# Patient Record
Sex: FEMALE | Race: OTHER | Employment: UNEMPLOYED | URBAN - METROPOLITAN AREA
[De-identification: names, ages, dates, MRNs, and addresses within clinical notes are randomized per-mention and may not be internally consistent; named-entity substitution may affect disease eponyms.]

---

## 2021-01-19 ENCOUNTER — HOSPITAL ENCOUNTER (OUTPATIENT)
Age: 21
Discharge: HOME | End: 2021-01-19

## 2021-01-19 ENCOUNTER — INITIAL PRENATAL (OUTPATIENT)
Dept: OBGYN | Age: 21
End: 2021-01-19

## 2021-01-19 VITALS
BODY MASS INDEX: 21.6 KG/M2 | SYSTOLIC BLOOD PRESSURE: 102 MMHG | HEIGHT: 60 IN | WEIGHT: 110 LBS | HEART RATE: 88 BPM | DIASTOLIC BLOOD PRESSURE: 66 MMHG

## 2021-01-19 DIAGNOSIS — Z34.81 PRENATAL CARE, SUBSEQUENT PREGNANCY, FIRST TRIMESTER: Primary | ICD-10-CM

## 2021-01-19 DIAGNOSIS — Z34.91 PRENATAL CARE IN FIRST TRIMESTER: ICD-10-CM

## 2021-01-19 DIAGNOSIS — Z34.81 PRENATAL CARE, SUBSEQUENT PREGNANCY, FIRST TRIMESTER: ICD-10-CM

## 2021-01-19 PROBLEM — Z78.9 LANGUAGE BARRIER: Status: ACTIVE | Noted: 2021-01-19

## 2021-01-19 LAB
ABO/RH: NORMAL
ANTIBODY SCREEN: NORMAL
BILIRUBIN URINE: NEGATIVE MG/DL
BLOOD, URINE: NEGATIVE
CLARITY: CLEAR
COLOR: YELLOW
GLUCOSE URINE: NEGATIVE MG/DL
KETONES, URINE: NEGATIVE MG/DL
LEUKOCYTE ESTERASE, URINE: NEGATIVE
NITRITE, URINE: NEGATIVE
PH UA: 5.5 (ref 5–8)
PROTEIN UA: ABNORMAL MG/DL
SPECIFIC GRAVITY UA: >=1.03 (ref 1–1.03)
UROBILINOGEN, URINE: 0.2 E.U./DL

## 2021-01-19 PROCEDURE — 86901 BLOOD TYPING SEROLOGIC RH(D): CPT

## 2021-01-19 PROCEDURE — 87340 HEPATITIS B SURFACE AG IA: CPT

## 2021-01-19 PROCEDURE — 86762 RUBELLA ANTIBODY: CPT

## 2021-01-19 PROCEDURE — 87390 HIV-1 AG IA: CPT

## 2021-01-19 PROCEDURE — 85025 COMPLETE CBC W/AUTO DIFF WBC: CPT

## 2021-01-19 PROCEDURE — 86702 HIV-2 ANTIBODY: CPT

## 2021-01-19 PROCEDURE — 86803 HEPATITIS C AB TEST: CPT

## 2021-01-19 PROCEDURE — 99204 OFFICE O/P NEW MOD 45 MIN: CPT | Performed by: OBSTETRICS & GYNECOLOGY

## 2021-01-19 PROCEDURE — 87086 URINE CULTURE/COLONY COUNT: CPT

## 2021-01-19 PROCEDURE — 81003 URINALYSIS AUTO W/O SCOPE: CPT

## 2021-01-19 PROCEDURE — 88175 CYTOPATH C/V AUTO FLUID REDO: CPT

## 2021-01-19 PROCEDURE — 36415 COLL VENOUS BLD VENIPUNCTURE: CPT

## 2021-01-19 PROCEDURE — 87491 CHLMYD TRACH DNA AMP PROBE: CPT

## 2021-01-19 PROCEDURE — 86701 HIV-1ANTIBODY: CPT

## 2021-01-19 PROCEDURE — 86780 TREPONEMA PALLIDUM: CPT

## 2021-01-19 PROCEDURE — 86850 RBC ANTIBODY SCREEN: CPT

## 2021-01-19 PROCEDURE — 87591 N.GONORRHOEAE DNA AMP PROB: CPT

## 2021-01-19 PROCEDURE — 86900 BLOOD TYPING SEROLOGIC ABO: CPT

## 2021-01-19 RX ORDER — VITAMIN A, VITAMIN C, VITAMIN D-3, VITAMIN E, VITAMIN B-1, VITAMIN B-2, NIACIN, VITAMIN B-6, CALCIUM, IRON, ZINC, COPPER 4000; 120; 400; 22; 1.84; 3; 20; 10; 1; 12; 200; 27; 25; 2 [IU]/1; MG/1; [IU]/1; MG/1; MG/1; MG/1; MG/1; MG/1; MG/1; UG/1; MG/1; MG/1; MG/1; MG/1
1 TABLET ORAL DAILY
Qty: 30 TABLET | Refills: 11 | Status: SHIPPED | OUTPATIENT
Start: 2021-01-19

## 2021-01-19 SDOH — HEALTH STABILITY: MENTAL HEALTH: HOW OFTEN DO YOU HAVE A DRINK CONTAINING ALCOHOL?: NEVER

## 2021-01-19 NOTE — PROGRESS NOTES
[facility] Obstetric Social History (Ukrainian)    Patient Name Haleigh Davies [facility]  8-6-21 Prenatal Care Began  1-19-21    Phone [phone] (home)  [facility]    Emergency Contact: Unique Rodrigues    Phone [phone]    Marital Status: Single    x             Living Situation:  Lives with  son and  brother    How many children do you have? 1    Name   Aleadurga Weldon Age  May   Name   Age    Name   Age    Name   Age    Name   Age      Attitude about pregnancy: happy     Preparation for Infant arrival:  will purchase     Any Domestic Abuse:  denies  Physical Abuse:  denies  Sexual Abuse: denies  Substance Abuse: denies  History of Depression: denies  Other Mental Health Issues:      Education:  High School  Occupation: unemployed     [facility]  referral [address] [address]   Medicaid  Child only   Support System:  , friends, mom    Father of Baby: [place]  Age:  25 Education:  11th   Occupation:  [employer] (works in [address] in warm months)  Emotional Support:   Financial Support:    Any other children? 0  Attitude toward Pregnancy?  surprised  Relationship with Father of Baby:   2 years    Patient concerns/plans for self and infant: none,stay home     Summary:  Pt scored a 0 on depression scale.   Psychological Screen no additional concerns     Referrals Offered: [facility] Referral, [address] List  Follow-up needed:      : DOMINIK QUINN  Date: 1/19/2021     Follow-up:

## 2021-01-19 NOTE — PROGRESS NOTES
Initial PNV. Nausea occasional vomiting, breast tenderness. Denies any pain or bleeding. No cats in home, denies any recent falls or travel outside of [place]. Discussed/declined MSAFP. Would accept a blood transfusion if every needed. Declines flu vaccine.

## 2021-01-20 LAB
HEPATITIS C ANTIBODY INTERPRETATION: NORMAL
HIV AG/AB: NORMAL
HIV ANTIGEN: NORMAL
HIV-1 ANTIBODY: NORMAL
HIV-2 AB: NORMAL
URINE CULTURE, ROUTINE: NORMAL

## 2021-01-21 LAB
BASOPHILS ABSOLUTE: 0 K/UL (ref 0–0.2)
BASOPHILS RELATIVE PERCENT: 0.3 %
EOSINOPHILS ABSOLUTE: 0.2 K/UL (ref 0–0.6)
EOSINOPHILS RELATIVE PERCENT: 2.1 %
HCT VFR BLD CALC: 37.6 % (ref 36–48)
HEMOGLOBIN: 12.9 G/DL (ref 12–16)
HEPATITIS B SURFACE ANTIGEN INTERPRETATION: NORMAL
LYMPHOCYTES ABSOLUTE: 1.6 K/UL (ref 1–5.1)
LYMPHOCYTES RELATIVE PERCENT: 22.6 %
MCH RBC QN AUTO: 29.3 PG (ref 26–34)
MCHC RBC AUTO-ENTMCNC: 34.3 G/DL (ref 31–36)
MCV RBC AUTO: 85.6 FL (ref 80–100)
MONOCYTES ABSOLUTE: 0.4 K/UL (ref 0–1.3)
MONOCYTES RELATIVE PERCENT: 6.2 %
NEUTROPHILS ABSOLUTE: 5 K/UL (ref 1.7–7.7)
NEUTROPHILS RELATIVE PERCENT: 68.8 %
PDW BLD-RTO: 13 % (ref 12.4–15.4)
PLATELET # BLD: 271 K/UL (ref 135–450)
PMV BLD AUTO: 8.7 FL (ref 5–10.5)
RBC # BLD: 4.4 M/UL (ref 4–5.2)
RUBELLA ANTIBODY IGG: 278.8 IU/ML
TOTAL SYPHILLIS IGG/IGM: NORMAL
WBC # BLD: 7.2 K/UL (ref 4–11)

## 2021-01-26 LAB
C. TRACHOMATIS DNA,THIN PREP: NEGATIVE
N. GONORRHOEAE DNA, THIN PREP: NEGATIVE

## 2021-01-29 ENCOUNTER — HOSPITAL ENCOUNTER (OUTPATIENT)
Dept: ULTRASOUND IMAGING | Age: 21
Discharge: HOME | End: 2021-01-29

## 2021-01-29 DIAGNOSIS — Z34.81 PRENATAL CARE, SUBSEQUENT PREGNANCY, FIRST TRIMESTER: ICD-10-CM

## 2021-01-29 PROCEDURE — 76801 OB US < 14 WKS SINGLE FETUS: CPT

## 2021-02-02 ENCOUNTER — TELEPHONE (OUTPATIENT)
Dept: OBGYN | Age: 21
End: 2021-02-02

## 2021-02-17 ENCOUNTER — ROUTINE PRENATAL (OUTPATIENT)
Dept: PERINATAL CARE | Age: 21
End: 2021-02-17

## 2021-02-17 VITALS
HEIGHT: 61 IN | DIASTOLIC BLOOD PRESSURE: 60 MMHG | WEIGHT: 111 LBS | SYSTOLIC BLOOD PRESSURE: 104 MMHG | BODY MASS INDEX: 20.96 KG/M2 | HEART RATE: 85 BPM

## 2021-02-17 DIAGNOSIS — Z78.9 LANGUAGE BARRIER: ICD-10-CM

## 2021-02-17 PROCEDURE — 76811 OB US DETAILED SNGL FETUS: CPT | Performed by: OBSTETRICS & GYNECOLOGY

## 2021-02-17 NOTE — PROGRESS NOTES
Initial MFM U/S with consult. Denies any vaginal bleeding, or abdominal/cramping pain. Still has periodic N&V. Next prenatal clinic appt. 2/18/21. Reports she has felt quickening last week.

## 2021-02-18 ENCOUNTER — ROUTINE PRENATAL (OUTPATIENT)
Dept: OBGYN | Age: 21
End: 2021-02-18

## 2021-02-18 VITALS
DIASTOLIC BLOOD PRESSURE: 56 MMHG | WEIGHT: 111.8 LBS | BODY MASS INDEX: 21.12 KG/M2 | HEART RATE: 74 BPM | SYSTOLIC BLOOD PRESSURE: 89 MMHG

## 2021-02-18 DIAGNOSIS — Z34.82 PRENATAL CARE, SUBSEQUENT PREGNANCY, SECOND TRIMESTER: Primary | ICD-10-CM

## 2021-02-18 LAB
BILIRUBIN URINE: NEGATIVE MG/DL
BLOOD, URINE: NEGATIVE
CLARITY: CLEAR
COLOR: YELLOW
GLUCOSE URINE: 100 MG/DL
KETONES, URINE: NEGATIVE MG/DL
LEUKOCYTE ESTERASE, URINE: ABNORMAL
NITRITE, URINE: NEGATIVE
PH UA: 6 (ref 5–8)
PROTEIN UA: NEGATIVE MG/DL
SPECIFIC GRAVITY UA: >=1.03 (ref 1–1.03)
UROBILINOGEN, URINE: 0.2 E.U./DL

## 2021-02-18 PROCEDURE — 81003 URINALYSIS AUTO W/O SCOPE: CPT

## 2021-02-18 PROCEDURE — 99213 OFFICE O/P EST LOW 20 MIN: CPT | Performed by: OBSTETRICS & GYNECOLOGY

## 2021-02-18 NOTE — PROGRESS NOTES
Chart reviewed. Dating by 6 w USD- states had level II USD yesterday for enlarged yolk sac and was WNL. Report not available today. Next USD for anatomy 20 weeks. Glucosuria noted (100 mg)- ate a sweet potato. No [address] DM and no GDM prior pregnancy.    Taking PNV

## 2021-02-19 LAB — URINE CULTURE, ROUTINE: NORMAL

## 2021-03-25 ENCOUNTER — HOSPITAL ENCOUNTER (OUTPATIENT)
Dept: ULTRASOUND IMAGING | Age: 21
Discharge: HOME | End: 2021-03-25

## 2021-03-25 ENCOUNTER — ROUTINE PRENATAL (OUTPATIENT)
Dept: OBGYN | Age: 21
End: 2021-03-25

## 2021-03-25 VITALS
DIASTOLIC BLOOD PRESSURE: 56 MMHG | HEART RATE: 80 BPM | SYSTOLIC BLOOD PRESSURE: 90 MMHG | WEIGHT: 114 LBS | BODY MASS INDEX: 21.54 KG/M2

## 2021-03-25 DIAGNOSIS — Z34.82 PRENATAL CARE, SUBSEQUENT PREGNANCY, SECOND TRIMESTER: ICD-10-CM

## 2021-03-25 DIAGNOSIS — Z34.82 PRENATAL CARE, SUBSEQUENT PREGNANCY, SECOND TRIMESTER: Primary | ICD-10-CM

## 2021-03-25 LAB
BILIRUBIN URINE: NEGATIVE MG/DL
BLOOD, URINE: NEGATIVE
CLARITY: CLEAR
COLOR: YELLOW
GLUCOSE URINE: NEGATIVE MG/DL
KETONES, URINE: NEGATIVE MG/DL
LEUKOCYTE ESTERASE, URINE: NEGATIVE
NITRITE, URINE: NEGATIVE
PH UA: 7 (ref 5–8)
PROTEIN UA: NEGATIVE MG/DL
SPECIFIC GRAVITY UA: 1.02 (ref 1–1.03)
UROBILINOGEN, URINE: 0.2 E.U./DL

## 2021-03-25 PROCEDURE — 99212 OFFICE O/P EST SF 10 MIN: CPT | Performed by: OBSTETRICS & GYNECOLOGY

## 2021-03-25 PROCEDURE — 76805 OB US >/= 14 WKS SNGL FETUS: CPT

## 2021-03-25 PROCEDURE — 81003 URINALYSIS AUTO W/O SCOPE: CPT

## 2021-05-06 ENCOUNTER — ROUTINE PRENATAL (OUTPATIENT)
Dept: OBGYN | Age: 21
End: 2021-05-06

## 2021-05-06 ENCOUNTER — HOSPITAL ENCOUNTER (OUTPATIENT)
Age: 21
Discharge: HOME | End: 2021-05-06

## 2021-05-06 VITALS
SYSTOLIC BLOOD PRESSURE: 94 MMHG | HEART RATE: 90 BPM | WEIGHT: 121 LBS | BODY MASS INDEX: 22.86 KG/M2 | DIASTOLIC BLOOD PRESSURE: 63 MMHG

## 2021-05-06 DIAGNOSIS — Z34.82 PRENATAL CARE, SUBSEQUENT PREGNANCY, SECOND TRIMESTER: ICD-10-CM

## 2021-05-06 DIAGNOSIS — Z34.82 PRENATAL CARE, SUBSEQUENT PREGNANCY, SECOND TRIMESTER: Primary | ICD-10-CM

## 2021-05-06 DIAGNOSIS — Z34.92 PRENATAL CARE IN SECOND TRIMESTER: ICD-10-CM

## 2021-05-06 LAB
BILIRUBIN URINE: NEGATIVE MG/DL
BLOOD, URINE: NEGATIVE
CLARITY: CLEAR
COLOR: YELLOW
GLUCOSE CHALLENGE: 116 MG/DL
GLUCOSE URINE: NEGATIVE MG/DL
HCT VFR BLD CALC: 31 % (ref 36–48)
HEMOGLOBIN: 10.9 G/DL (ref 12–16)
KETONES, URINE: NEGATIVE MG/DL
LEUKOCYTE ESTERASE, URINE: NEGATIVE
MCH RBC QN AUTO: 32.3 PG (ref 26–34)
MCHC RBC AUTO-ENTMCNC: 35.3 G/DL (ref 31–36)
MCV RBC AUTO: 91.6 FL (ref 80–100)
NITRITE, URINE: NEGATIVE
PDW BLD-RTO: 13.6 % (ref 12.4–15.4)
PH UA: 6 (ref 5–8)
PLATELET # BLD: 251 K/UL (ref 135–450)
PMV BLD AUTO: 8.6 FL (ref 5–10.5)
PROTEIN UA: 30 MG/DL
RBC # BLD: 3.38 M/UL (ref 4–5.2)
SPECIFIC GRAVITY UA: >=1.03 (ref 1–1.03)
UROBILINOGEN, URINE: 0.2 E.U./DL
WBC # BLD: 7.1 K/UL (ref 4–11)

## 2021-05-06 PROCEDURE — 81003 URINALYSIS AUTO W/O SCOPE: CPT

## 2021-05-06 PROCEDURE — 36415 COLL VENOUS BLD VENIPUNCTURE: CPT

## 2021-05-06 PROCEDURE — 99212 OFFICE O/P EST SF 10 MIN: CPT | Performed by: OBSTETRICS & GYNECOLOGY

## 2021-05-06 PROCEDURE — 82950 GLUCOSE TEST: CPT

## 2021-05-06 PROCEDURE — 85027 COMPLETE CBC AUTOMATED: CPT

## 2021-05-06 NOTE — PROGRESS NOTES
Doing well, reports good fetal, occasional/rare complaint of abdominal pain last time was last week. Denies any bleeding or leaking of fluid.  GCT today

## 2021-05-06 NOTE — PROGRESS NOTES
Social Service Note:   Patient completed depression scale. Patient scored a 0 and is not showing signs of depression. Patient didn't have any other questions or needs at this time.     Thanh SAEEDW, [place]

## 2021-06-10 ENCOUNTER — ROUTINE PRENATAL (OUTPATIENT)
Dept: OBGYN | Age: 21
End: 2021-06-10

## 2021-06-10 VITALS
DIASTOLIC BLOOD PRESSURE: 60 MMHG | BODY MASS INDEX: 24.19 KG/M2 | HEART RATE: 88 BPM | WEIGHT: 128 LBS | SYSTOLIC BLOOD PRESSURE: 94 MMHG

## 2021-06-10 DIAGNOSIS — Z34.93 PRENATAL CARE IN THIRD TRIMESTER: Primary | ICD-10-CM

## 2021-06-10 LAB
BILIRUBIN URINE: NEGATIVE MG/DL
BLOOD, URINE: NEGATIVE
CLARITY: CLEAR
COLOR: YELLOW
GLUCOSE URINE: NEGATIVE MG/DL
KETONES, URINE: NEGATIVE MG/DL
LEUKOCYTE ESTERASE, URINE: NEGATIVE
NITRITE, URINE: NEGATIVE
PH UA: 6 (ref 5–8)
PROTEIN UA: NEGATIVE MG/DL
SPECIFIC GRAVITY UA: >=1.03 (ref 1–1.03)
UROBILINOGEN, URINE: 0.2 E.U./DL

## 2021-06-10 PROCEDURE — 81003 URINALYSIS AUTO W/O SCOPE: CPT

## 2021-06-10 PROCEDURE — 99212 OFFICE O/P EST SF 10 MIN: CPT | Performed by: OBSTETRICS & GYNECOLOGY

## 2021-06-10 RX ORDER — FERROUS SULFATE 325(65) MG
325 TABLET ORAL DAILY
Qty: 30 TABLET | Refills: 3 | Status: ON HOLD | OUTPATIENT
Start: 2021-06-10 | End: 2021-08-16 | Stop reason: HOSPADM

## 2021-06-10 NOTE — PROGRESS NOTES
Pt with no c/o today; states baby is active. Patient denies vaginal bleeding and leaking of fluid/ctx. Iron prescription given and instructed with patient verbalizing understanding. Tdap vaccine information sheet given and explained; pt states understanding;wants to think about it.

## 2021-06-24 ENCOUNTER — ROUTINE PRENATAL (OUTPATIENT)
Dept: OBGYN | Age: 21
End: 2021-06-24

## 2021-06-24 VITALS
SYSTOLIC BLOOD PRESSURE: 103 MMHG | DIASTOLIC BLOOD PRESSURE: 65 MMHG | WEIGHT: 131 LBS | BODY MASS INDEX: 24.75 KG/M2 | HEART RATE: 86 BPM

## 2021-06-24 DIAGNOSIS — Z34.93 PRENATAL CARE IN THIRD TRIMESTER: Primary | ICD-10-CM

## 2021-06-24 LAB
BILIRUBIN URINE: NEGATIVE MG/DL
BLOOD, URINE: NEGATIVE
CLARITY: CLEAR
COLOR: YELLOW
GLUCOSE URINE: 100 MG/DL
KETONES, URINE: NEGATIVE MG/DL
LEUKOCYTE ESTERASE, URINE: NEGATIVE
NITRITE, URINE: NEGATIVE
PH UA: 7 (ref 5–8)
PROTEIN UA: ABNORMAL MG/DL
SPECIFIC GRAVITY UA: 1.02 (ref 1–1.03)
UROBILINOGEN, URINE: 1 E.U./DL

## 2021-06-24 PROCEDURE — 81003 URINALYSIS AUTO W/O SCOPE: CPT

## 2021-06-24 PROCEDURE — 99212 OFFICE O/P EST SF 10 MIN: CPT | Performed by: OBSTETRICS & GYNECOLOGY

## 2021-06-24 PROCEDURE — 90715 TDAP VACCINE 7 YRS/> IM: CPT | Performed by: OBSTETRICS & GYNECOLOGY

## 2021-06-24 PROCEDURE — 90471 IMMUNIZATION ADMIN: CPT | Performed by: OBSTETRICS & GYNECOLOGY

## 2021-06-24 PROCEDURE — 6360000002 HC RX W HCPCS: Performed by: OBSTETRICS & GYNECOLOGY

## 2021-06-24 RX ADMIN — TETANUS TOXOID, REDUCED DIPHTHERIA TOXOID AND ACELLULAR PERTUSSIS VACCINE, ADSORBED 0.5 ML: 5; 2.5; 8; 8; 2.5 SUSPENSION INTRAMUSCULAR at 10:35

## 2021-06-24 NOTE — PROGRESS NOTES
Routine visit, denies contractions bleeding or leaking of fluid. + fetal movement instruction given regarding daily kick counts , verbalized understanding.

## 2021-07-13 ENCOUNTER — ROUTINE PRENATAL (OUTPATIENT)
Dept: OBGYN | Age: 21
End: 2021-07-13

## 2021-07-13 VITALS
DIASTOLIC BLOOD PRESSURE: 62 MMHG | HEART RATE: 82 BPM | WEIGHT: 131 LBS | SYSTOLIC BLOOD PRESSURE: 94 MMHG | BODY MASS INDEX: 24.75 KG/M2

## 2021-07-13 DIAGNOSIS — Z34.93 PRENATAL CARE IN THIRD TRIMESTER: Primary | ICD-10-CM

## 2021-07-13 LAB
BILIRUBIN URINE: NEGATIVE MG/DL
BLOOD, URINE: NEGATIVE
CLARITY: CLEAR
COLOR: YELLOW
GLUCOSE URINE: NEGATIVE MG/DL
KETONES, URINE: NEGATIVE MG/DL
LEUKOCYTE ESTERASE, URINE: ABNORMAL
NITRITE, URINE: NEGATIVE
PH UA: 7.5 (ref 5–8)
PROTEIN UA: NEGATIVE MG/DL
SPECIFIC GRAVITY UA: 1.02 (ref 1–1.03)
UROBILINOGEN, URINE: 0.2 E.U./DL

## 2021-07-13 PROCEDURE — 81003 URINALYSIS AUTO W/O SCOPE: CPT

## 2021-07-13 PROCEDURE — 99212 OFFICE O/P EST SF 10 MIN: CPT | Performed by: OBSTETRICS & GYNECOLOGY

## 2021-07-13 NOTE — PROGRESS NOTES
Reports + fetal movement,reminded to do daily JORGE A, denies bleeding, leaking. C/O ctx off and on past 24 hours, called today to be seen before appt. On Thurs. C/O H/A and 1 large emesis this morning.

## 2021-07-20 ENCOUNTER — ROUTINE PRENATAL (OUTPATIENT)
Dept: OBGYN | Age: 21
End: 2021-07-20

## 2021-07-20 VITALS
SYSTOLIC BLOOD PRESSURE: 106 MMHG | WEIGHT: 137 LBS | HEART RATE: 89 BPM | DIASTOLIC BLOOD PRESSURE: 70 MMHG | BODY MASS INDEX: 25.89 KG/M2

## 2021-07-20 DIAGNOSIS — Z34.83 NORMAL PREGNANCY IN MULTIGRAVIDA IN THIRD TRIMESTER: Primary | ICD-10-CM

## 2021-07-20 LAB
BILIRUBIN URINE: NEGATIVE MG/DL
BLOOD, URINE: NEGATIVE
CLARITY: CLEAR
COLOR: YELLOW
GLUCOSE URINE: NEGATIVE MG/DL
KETONES, URINE: NEGATIVE MG/DL
LEUKOCYTE ESTERASE, URINE: NEGATIVE
NITRITE, URINE: NEGATIVE
PH UA: 7 (ref 5–8)
PROTEIN UA: NEGATIVE MG/DL
SPECIFIC GRAVITY UA: 1.01 (ref 1–1.03)
UROBILINOGEN, URINE: 0.2 E.U./DL

## 2021-07-20 PROCEDURE — 81003 URINALYSIS AUTO W/O SCOPE: CPT

## 2021-07-20 PROCEDURE — 99212 OFFICE O/P EST SF 10 MIN: CPT | Performed by: OBSTETRICS & GYNECOLOGY

## 2021-07-20 NOTE — PROGRESS NOTES
Reports + fetal movement, meeting JORGE A,denies bleeding, leaking of fluid. Reports irreg ctx. Labor precautions reviewed.

## 2021-07-27 ENCOUNTER — ROUTINE PRENATAL (OUTPATIENT)
Dept: OBGYN | Age: 21
End: 2021-07-27

## 2021-07-27 ENCOUNTER — HOSPITAL ENCOUNTER (OUTPATIENT)
Age: 21
Discharge: HOME | End: 2021-07-27

## 2021-07-27 VITALS
SYSTOLIC BLOOD PRESSURE: 108 MMHG | WEIGHT: 138 LBS | DIASTOLIC BLOOD PRESSURE: 72 MMHG | BODY MASS INDEX: 26.07 KG/M2 | HEART RATE: 100 BPM

## 2021-07-27 DIAGNOSIS — Z34.83 PRENATAL CARE, SUBSEQUENT PREGNANCY, THIRD TRIMESTER: Primary | ICD-10-CM

## 2021-07-27 LAB
BILIRUBIN URINE: NEGATIVE MG/DL
BLOOD, URINE: NEGATIVE
CLARITY: CLEAR
COLOR: YELLOW
GLUCOSE URINE: NEGATIVE MG/DL
HCT VFR BLD CALC: 36.7 % (ref 36–48)
HEMOGLOBIN: 12.9 G/DL (ref 12–16)
KETONES, URINE: NEGATIVE MG/DL
LEUKOCYTE ESTERASE, URINE: NEGATIVE
MCH RBC QN AUTO: 31.6 PG (ref 26–34)
MCHC RBC AUTO-ENTMCNC: 35.1 G/DL (ref 31–36)
MCV RBC AUTO: 90 FL (ref 80–100)
NITRITE, URINE: NEGATIVE
PDW BLD-RTO: 13.9 % (ref 12.4–15.4)
PH UA: 6 (ref 5–8)
PLATELET # BLD: 244 K/UL (ref 135–450)
PMV BLD AUTO: 8.9 FL (ref 5–10.5)
PROTEIN UA: NEGATIVE MG/DL
RBC # BLD: 4.07 M/UL (ref 4–5.2)
SPECIFIC GRAVITY UA: 1.02 (ref 1–1.03)
UROBILINOGEN, URINE: 0.2 E.U./DL
WBC # BLD: 7.7 K/UL (ref 4–11)

## 2021-07-27 PROCEDURE — 87081 CULTURE SCREEN ONLY: CPT

## 2021-07-27 PROCEDURE — 36415 COLL VENOUS BLD VENIPUNCTURE: CPT

## 2021-07-27 PROCEDURE — 85027 COMPLETE CBC AUTOMATED: CPT

## 2021-07-27 PROCEDURE — 99212 OFFICE O/P EST SF 10 MIN: CPT | Performed by: OBSTETRICS & GYNECOLOGY

## 2021-07-27 PROCEDURE — 81003 URINALYSIS AUTO W/O SCOPE: CPT

## 2021-07-27 NOTE — PROGRESS NOTES
Report + fetal movement, denies bleeding, leaking of fluid or pain. Reports irregular ctx. Meeting daily [address]

## 2021-07-30 LAB — GROUP B STREP CULTURE: NORMAL

## 2021-08-02 ENCOUNTER — HOSPITAL ENCOUNTER (OUTPATIENT)
Age: 21
Discharge: HOME | End: 2021-08-02
Attending: OBSTETRICS & GYNECOLOGY | Admitting: OBSTETRICS & GYNECOLOGY
Payer: MEDICAID

## 2021-08-02 VITALS
WEIGHT: 135 LBS | HEART RATE: 92 BPM | TEMPERATURE: 98.8 F | RESPIRATION RATE: 18 BRPM | BODY MASS INDEX: 26.5 KG/M2 | DIASTOLIC BLOOD PRESSURE: 63 MMHG | HEIGHT: 60 IN | SYSTOLIC BLOOD PRESSURE: 95 MMHG

## 2021-08-02 PROBLEM — Z34.90 TERM PREGNANCY: Status: ACTIVE | Noted: 2021-08-02

## 2021-08-02 PROCEDURE — 99211 OFF/OP EST MAY X REQ PHY/QHP: CPT

## 2021-08-02 PROCEDURE — 59025 FETAL NON-STRESS TEST: CPT

## 2021-08-02 NOTE — FLOWSHEET NOTE
Dr Hollis Wheatley at bedside, orders received to discharge patient to home and follow up as scheduled.   Pain is likely sciatica

## 2021-08-02 NOTE — FLOWSHEET NOTE
Discharge instructions reviewed with patient and friend. Both verbalize understanding and have no further complaints or concerns at this time.  Patient is ambulatory off unit

## 2021-08-03 ENCOUNTER — ROUTINE PRENATAL (OUTPATIENT)
Dept: OBGYN | Age: 21
End: 2021-08-03
Payer: MEDICAID

## 2021-08-03 VITALS
WEIGHT: 140 LBS | SYSTOLIC BLOOD PRESSURE: 101 MMHG | HEART RATE: 96 BPM | DIASTOLIC BLOOD PRESSURE: 70 MMHG | BODY MASS INDEX: 27.34 KG/M2

## 2021-08-03 DIAGNOSIS — Z34.83 ENCOUNTER FOR SUPERVISION OF OTHER NORMAL PREGNANCY IN THIRD TRIMESTER: Primary | ICD-10-CM

## 2021-08-03 LAB
BILIRUBIN URINE: NEGATIVE MG/DL
BLOOD, URINE: NEGATIVE
CLARITY: CLEAR
COLOR: YELLOW
GLUCOSE URINE: NEGATIVE MG/DL
KETONES, URINE: ABNORMAL MG/DL
LEUKOCYTE ESTERASE, URINE: NEGATIVE
NITRITE, URINE: NEGATIVE
PH UA: 6.5 (ref 5–8)
PROTEIN UA: NEGATIVE MG/DL
SPECIFIC GRAVITY UA: 1.02 (ref 1–1.03)
UROBILINOGEN, URINE: 1 E.U./DL

## 2021-08-03 PROCEDURE — 99212 OFFICE O/P EST SF 10 MIN: CPT | Performed by: OBSTETRICS & GYNECOLOGY

## 2021-08-03 PROCEDURE — 81003 URINALYSIS AUTO W/O SCOPE: CPT

## 2021-08-03 NOTE — H&P
Department of Obstetrics and Gynecology  Labor and Delivery Triage Note        CHIEF COMPLAINT:  Right hip pain    HISTORY OF PRESENT ILLNESS:      The patient is a 19 y.o. female 37w6d. OB History        2    Para   1    Term   1            AB        Living   1       SAB        TAB        Ectopic        Molar        Multiple        Live Births   1              Patient presents with a chief complaint as above. Her pain is located at suprapubic region and is described as aching. The pain is intermittent and started several weeks ago    Estimated Due Date:  Estimated Date of Delivery: 21    PRENATAL CARE:    Complicated by: none    REVIEW OF SYSTEMS:    CONSTITUTIONAL:  negative  EYES:  negative  HEENT:  negative  RESPIRATORY:  negative  CARDIOVASCULAR:  negative  GASTROINTESTINAL:  negative  GENITOURINARY:  negative  INTEGUMENT/BREAST:  negative  MUSCULOSKELETAL:  negative  BEHAVIOR/PSYCH:  negative    PHYSICAL EXAM:    Vitals:    21 1924   BP: 95/63   Pulse: 92   Resp: 18   Temp: 98.8 °F (37.1 °C)   Weight: 135 lb (61.2 kg)   Height: 5' (1.524 m)       Physical Examination: General appearance - alert, well appearing, and in no distress  Chest - clear to auscultation, no wheezes, rales or rhonchi, symmetric air entry  Heart - normal rate, regular rhythm, normal S1, S2, no murmurs, rubs, clicks or gallops  Abdomen - soft, nontender, nondistended, no masses or organomegaly                         Contraction frequency:  0 minutes    Membranes:  Intact    Lab Results   Component Value Date    WBC 7.7 2021    HGB 12.9 2021    HCT 36.7 2021     2021       ASSESSMENT AND PLAN:  Discharge Dx: The patient is a 19 y.o. female 37w6d.     OB History        2    Para   1    Term   1            AB        Living   1       SAB        TAB        Ectopic        Molar        Multiple        Live Births   1            Right hip pain- most likely muskulo skeletal- local heat is recommended.     Disposition:  Patient discharged home and instructed on symptoms of labor, rupture of membranes, vaginal bleeding, fetal movement and fever  Medications:    Kari Duff   Home Medication Instructions AUU:[id number]    Printed on:08/03/21 5304   Medication Information                      ferrous sulfate (ANG-ALEXANDREA) 325 (65 Fe) MG tablet  Take 1 tablet by mouth daily             Prenatal Vit-Fe Fumarate-FA (PRENATAL VITAMIN PLUS LOW IRON) 27-1 MG TABS  Take 1 tablet by mouth daily                F/U Instructions: as needed

## 2021-08-03 NOTE — PROGRESS NOTES
Routine visit, complains in increase of contractions and strength denies bleeding does have mucous discharge. + fetal movement meeting daily kick counts.

## 2021-08-05 ENCOUNTER — HOSPITAL ENCOUNTER (OUTPATIENT)
Age: 21
Discharge: HOME | End: 2021-08-05
Attending: OBSTETRICS & GYNECOLOGY | Admitting: OBSTETRICS & GYNECOLOGY
Payer: MEDICAID

## 2021-08-05 VITALS — TEMPERATURE: 97.7 F | RESPIRATION RATE: 17 BRPM

## 2021-08-05 LAB
BILIRUBIN URINE: NEGATIVE
BLOOD, URINE: NEGATIVE
CLARITY: CLEAR
COLOR: YELLOW
GLUCOSE URINE: NEGATIVE MG/DL
KETONES, URINE: NEGATIVE MG/DL
LEUKOCYTE ESTERASE, URINE: NEGATIVE
MICROSCOPIC EXAMINATION: NORMAL
NITRITE, URINE: NEGATIVE
PH UA: 6.5 (ref 5–8)
PROTEIN UA: NEGATIVE MG/DL
SPECIFIC GRAVITY UA: 1.02 (ref 1–1.03)
URINE TYPE: NORMAL
UROBILINOGEN, URINE: 1 E.U./DL

## 2021-08-05 PROCEDURE — 81003 URINALYSIS AUTO W/O SCOPE: CPT

## 2021-08-05 PROCEDURE — 59025 FETAL NON-STRESS TEST: CPT

## 2021-08-05 PROCEDURE — 99211 OFF/OP EST MAY X REQ PHY/QHP: CPT

## 2021-08-05 NOTE — FLOWSHEET NOTE
Discharge instructions discussed with patient. Labor precautions given and verbal understanding was recd. Patient discharged home, stable , and undelivered.

## 2021-08-05 NOTE — FLOWSHEET NOTE
Spoke with Dr. Cristela Valdovinos, update on patient status and SVE, no change. POc to discharge home.

## 2021-08-05 NOTE — FLOWSHEET NOTE
Patient arrive to triage with c/o contractions. Placed on monitor, history complete via video translation. POC discussed and will monitor for labor. Verbal understanding recd.

## 2021-08-05 NOTE — PROCEDURES
FETAL SURVEILLANCE TESTING SUMMARY    INDICATIONS:  rule out uterine contractions    OBJECTIVE RESULTS:  Fetal heart variability: moderate  Fetal Heart Rate accelerations: yes  Baseline FHR: 135 per minute  Fetal Non-stress Test: reactive    Fetal surveillance: reassuring    Occasional contractions, no cervical change between exams

## 2021-08-10 ENCOUNTER — ROUTINE PRENATAL (OUTPATIENT)
Dept: OBGYN | Age: 21
End: 2021-08-10
Payer: MEDICAID

## 2021-08-10 VITALS
HEART RATE: 72 BPM | SYSTOLIC BLOOD PRESSURE: 112 MMHG | DIASTOLIC BLOOD PRESSURE: 77 MMHG | WEIGHT: 140 LBS | BODY MASS INDEX: 27.34 KG/M2

## 2021-08-10 DIAGNOSIS — Z34.93 PRENATAL CARE IN THIRD TRIMESTER: Primary | ICD-10-CM

## 2021-08-10 LAB
BILIRUBIN URINE: NEGATIVE MG/DL
BLOOD, URINE: NEGATIVE
CLARITY: CLEAR
COLOR: YELLOW
GLUCOSE URINE: NEGATIVE MG/DL
KETONES, URINE: NEGATIVE MG/DL
LEUKOCYTE ESTERASE, URINE: ABNORMAL
NITRITE, URINE: NEGATIVE
PH UA: 7.5 (ref 5–8)
PROTEIN UA: NEGATIVE MG/DL
SPECIFIC GRAVITY UA: 1.01 (ref 1–1.03)
UROBILINOGEN, URINE: 0.2 E.U./DL

## 2021-08-10 PROCEDURE — 81003 URINALYSIS AUTO W/O SCOPE: CPT

## 2021-08-10 PROCEDURE — 99213 OFFICE O/P EST LOW 20 MIN: CPT | Performed by: OBSTETRICS & GYNECOLOGY

## 2021-08-10 NOTE — PROGRESS NOTES
Pt with no c/o today; states baby is active and meeting daily kick counts. Patient states no vaginal bleeding and occasional leaking of white mucous vaginal fluid/ occasional ctx.

## 2021-08-15 ENCOUNTER — HOSPITAL ENCOUNTER (OUTPATIENT)
Age: 21
Discharge: HOME | DRG: 560 | End: 2021-08-15
Attending: OBSTETRICS & GYNECOLOGY | Admitting: OBSTETRICS & GYNECOLOGY
Payer: MEDICAID

## 2021-08-15 VITALS
RESPIRATION RATE: 18 BRPM | SYSTOLIC BLOOD PRESSURE: 120 MMHG | TEMPERATURE: 98.1 F | DIASTOLIC BLOOD PRESSURE: 74 MMHG | HEART RATE: 72 BPM

## 2021-08-15 PROCEDURE — 99211 OFF/OP EST MAY X REQ PHY/QHP: CPT

## 2021-08-15 NOTE — FLOWSHEET NOTE
Dr. Manuel  notified of patient arrival. Dr. Manuel  made aware of cervical exam and desire to not be induced. Per MD have patient walk then recheck cervix. Patient agreeable with plan of care.

## 2021-08-16 ENCOUNTER — ANESTHESIA EVENT (OUTPATIENT)
Dept: LABOR AND DELIVERY | Age: 21
DRG: 560 | End: 2021-08-16
Payer: MEDICAID

## 2021-08-16 ENCOUNTER — HOSPITAL ENCOUNTER (INPATIENT)
Age: 21
LOS: 1 days | Discharge: HOME | DRG: 560 | End: 2021-08-17
Attending: OBSTETRICS & GYNECOLOGY | Admitting: OBSTETRICS & GYNECOLOGY
Payer: MEDICAID

## 2021-08-16 ENCOUNTER — ANESTHESIA (OUTPATIENT)
Dept: LABOR AND DELIVERY | Age: 21
DRG: 560 | End: 2021-08-16
Payer: MEDICAID

## 2021-08-16 LAB
AMPHETAMINE SCREEN, URINE: NORMAL
BACTERIA: ABNORMAL /HPF
BARBITURATE SCREEN URINE: NORMAL
BENZODIAZEPINE SCREEN, URINE: NORMAL
BILIRUBIN URINE: NEGATIVE
BLOOD, URINE: ABNORMAL
BUPRENORPHINE URINE: NORMAL
CANNABINOID SCREEN URINE: NORMAL
CLARITY: ABNORMAL
COCAINE METABOLITE SCREEN URINE: NORMAL
COLOR: YELLOW
EPITHELIAL CELLS, UA: 4 /HPF (ref 0–5)
GLUCOSE URINE: NEGATIVE MG/DL
HCT VFR BLD CALC: 36.1 % (ref 36–48)
HEMOGLOBIN: 12.7 G/DL (ref 12–16)
HYALINE CASTS: 2 /LPF (ref 0–8)
KETONES, URINE: NEGATIVE MG/DL
LEUKOCYTE ESTERASE, URINE: ABNORMAL
Lab: NORMAL
MCH RBC QN AUTO: 31.4 PG (ref 26–34)
MCHC RBC AUTO-ENTMCNC: 35.1 G/DL (ref 31–36)
MCV RBC AUTO: 89.4 FL (ref 80–100)
METHADONE SCREEN, URINE: NORMAL
MICROSCOPIC EXAMINATION: YES
NITRITE, URINE: NEGATIVE
OPIATE SCREEN URINE: NORMAL
OXYCODONE URINE: NORMAL
PDW BLD-RTO: 14.3 % (ref 12.4–15.4)
PH UA: 5
PH UA: 6 (ref 5–8)
PHENCYCLIDINE SCREEN URINE: NORMAL
PLATELET # BLD: 204 K/UL (ref 135–450)
PMV BLD AUTO: 9.4 FL (ref 5–10.5)
PROPOXYPHENE SCREEN: NORMAL
PROTEIN UA: NEGATIVE MG/DL
RBC # BLD: 4.03 M/UL (ref 4–5.2)
RBC UA: 2 /HPF (ref 0–4)
SARS-COV-2, NAAT: NOT DETECTED
SPECIFIC GRAVITY UA: 1.01 (ref 1–1.03)
SPECIMEN STATUS: NORMAL
URINE TYPE: ABNORMAL
UROBILINOGEN, URINE: 0.2 E.U./DL
WBC # BLD: 13.2 K/UL (ref 4–11)
WBC UA: 23 /HPF (ref 0–5)

## 2021-08-16 PROCEDURE — 7200000001 HC VAGINAL DELIVERY

## 2021-08-16 PROCEDURE — 6360000002 HC RX W HCPCS: Performed by: ANESTHESIOLOGY

## 2021-08-16 PROCEDURE — 0UQMXZZ REPAIR VULVA, EXTERNAL APPROACH: ICD-10-PCS | Performed by: OBSTETRICS & GYNECOLOGY

## 2021-08-16 PROCEDURE — 6360000002 HC RX W HCPCS: Performed by: OBSTETRICS & GYNECOLOGY

## 2021-08-16 PROCEDURE — 86780 TREPONEMA PALLIDUM: CPT

## 2021-08-16 PROCEDURE — 87635 SARS-COV-2 COVID-19 AMP PRB: CPT

## 2021-08-16 PROCEDURE — 2580000003 HC RX 258: Performed by: OBSTETRICS & GYNECOLOGY

## 2021-08-16 PROCEDURE — 6370000000 HC RX 637 (ALT 250 FOR IP): Performed by: OBSTETRICS & GYNECOLOGY

## 2021-08-16 PROCEDURE — 1220000000 HC SEMI PRIVATE OB R&B

## 2021-08-16 PROCEDURE — 80307 DRUG TEST PRSMV CHEM ANLYZR: CPT

## 2021-08-16 PROCEDURE — 10907ZC DRAINAGE OF AMNIOTIC FLUID, THERAPEUTIC FROM PRODUCTS OF CONCEPTION, VIA NATURAL OR ARTIFICIAL OPENING: ICD-10-PCS | Performed by: OBSTETRICS & GYNECOLOGY

## 2021-08-16 PROCEDURE — 2500000003 HC RX 250 WO HCPCS: Performed by: NURSE ANESTHETIST, CERTIFIED REGISTERED

## 2021-08-16 PROCEDURE — 3700000025 EPIDURAL BLOCK: Performed by: ANESTHESIOLOGY

## 2021-08-16 PROCEDURE — 81001 URINALYSIS AUTO W/SCOPE: CPT

## 2021-08-16 PROCEDURE — 85027 COMPLETE CBC AUTOMATED: CPT

## 2021-08-16 PROCEDURE — 51702 INSERT TEMP BLADDER CATH: CPT

## 2021-08-16 RX ORDER — OXYCODONE HYDROCHLORIDE AND ACETAMINOPHEN 5; 325 MG/1; MG/1
2 TABLET ORAL EVERY 4 HOURS PRN
Status: DISCONTINUED | OUTPATIENT
Start: 2021-08-16 | End: 2021-08-17 | Stop reason: HOSPADM

## 2021-08-16 RX ORDER — SODIUM CHLORIDE 0.9 % (FLUSH) 0.9 %
5-40 SYRINGE (ML) INJECTION PRN
Status: DISCONTINUED | OUTPATIENT
Start: 2021-08-16 | End: 2021-08-17 | Stop reason: HOSPADM

## 2021-08-16 RX ORDER — FENTANYL/BUPIVACAINE/NS/PF 2-1250MCG
PLASTIC BAG, INJECTION (ML) INJECTION
Status: COMPLETED
Start: 2021-08-16 | End: 2021-08-16

## 2021-08-16 RX ORDER — DEXMEDETOMIDINE HYDROCHLORIDE 100 UG/ML
INJECTION, SOLUTION INTRAVENOUS
Status: COMPLETED
Start: 2021-08-16 | End: 2021-08-16

## 2021-08-16 RX ORDER — SODIUM CHLORIDE, SODIUM LACTATE, POTASSIUM CHLORIDE, CALCIUM CHLORIDE 600; 310; 30; 20 MG/100ML; MG/100ML; MG/100ML; MG/100ML
500 INJECTION, SOLUTION INTRAVENOUS PRN
Status: DISCONTINUED | OUTPATIENT
Start: 2021-08-16 | End: 2021-08-16

## 2021-08-16 RX ORDER — SENNA AND DOCUSATE SODIUM 50; 8.6 MG/1; MG/1
1 TABLET, FILM COATED ORAL DAILY PRN
Status: DISCONTINUED | OUTPATIENT
Start: 2021-08-16 | End: 2021-08-17 | Stop reason: HOSPADM

## 2021-08-16 RX ORDER — ONDANSETRON 4 MG/1
4 TABLET, ORALLY DISINTEGRATING ORAL EVERY 6 HOURS PRN
Status: DISCONTINUED | OUTPATIENT
Start: 2021-08-16 | End: 2021-08-17 | Stop reason: HOSPADM

## 2021-08-16 RX ORDER — SODIUM CHLORIDE 0.9 % (FLUSH) 0.9 %
5-40 SYRINGE (ML) INJECTION EVERY 12 HOURS SCHEDULED
Status: DISCONTINUED | OUTPATIENT
Start: 2021-08-16 | End: 2021-08-17 | Stop reason: HOSPADM

## 2021-08-16 RX ORDER — SODIUM CHLORIDE, SODIUM LACTATE, POTASSIUM CHLORIDE, CALCIUM CHLORIDE 600; 310; 30; 20 MG/100ML; MG/100ML; MG/100ML; MG/100ML
INJECTION, SOLUTION INTRAVENOUS CONTINUOUS
Status: DISCONTINUED | OUTPATIENT
Start: 2021-08-16 | End: 2021-08-16

## 2021-08-16 RX ORDER — IBUPROFEN 800 MG/1
800 TABLET ORAL EVERY 8 HOURS PRN
Status: DISCONTINUED | OUTPATIENT
Start: 2021-08-16 | End: 2021-08-17 | Stop reason: HOSPADM

## 2021-08-16 RX ORDER — ACETAMINOPHEN 325 MG/1
650 TABLET ORAL EVERY 4 HOURS PRN
Status: DISCONTINUED | OUTPATIENT
Start: 2021-08-16 | End: 2021-08-16

## 2021-08-16 RX ORDER — IBUPROFEN 800 MG/1
800 TABLET ORAL EVERY 8 HOURS PRN
Qty: 30 TABLET | Refills: 0 | Status: SHIPPED | OUTPATIENT
Start: 2021-08-16

## 2021-08-16 RX ORDER — FENTANYL/BUPIVACAINE/NS/PF 2-1250MCG
12 PLASTIC BAG, INJECTION (ML) INJECTION CONTINUOUS
Status: DISCONTINUED | OUTPATIENT
Start: 2021-08-16 | End: 2021-08-16

## 2021-08-16 RX ORDER — FERROUS SULFATE 325(65) MG
325 TABLET ORAL DAILY
Status: DISCONTINUED | OUTPATIENT
Start: 2021-08-16 | End: 2021-08-17 | Stop reason: HOSPADM

## 2021-08-16 RX ORDER — DOCUSATE SODIUM 100 MG/1
100 CAPSULE, LIQUID FILLED ORAL 2 TIMES DAILY
Status: DISCONTINUED | OUTPATIENT
Start: 2021-08-16 | End: 2021-08-17 | Stop reason: HOSPADM

## 2021-08-16 RX ORDER — ONDANSETRON 2 MG/ML
4 INJECTION INTRAMUSCULAR; INTRAVENOUS EVERY 6 HOURS PRN
Status: DISCONTINUED | OUTPATIENT
Start: 2021-08-16 | End: 2021-08-17 | Stop reason: HOSPADM

## 2021-08-16 RX ORDER — ACETAMINOPHEN 500 MG
1000 TABLET ORAL EVERY 8 HOURS PRN
Status: DISCONTINUED | OUTPATIENT
Start: 2021-08-16 | End: 2021-08-17 | Stop reason: HOSPADM

## 2021-08-16 RX ORDER — NALBUPHINE HCL 10 MG/ML
10 AMPUL (ML) INJECTION
Status: DISCONTINUED | OUTPATIENT
Start: 2021-08-16 | End: 2021-08-16

## 2021-08-16 RX ORDER — FAMOTIDINE 20 MG/1
20 TABLET, FILM COATED ORAL 2 TIMES DAILY PRN
Status: DISCONTINUED | OUTPATIENT
Start: 2021-08-16 | End: 2021-08-17 | Stop reason: HOSPADM

## 2021-08-16 RX ORDER — MODIFIED LANOLIN
OINTMENT (GRAM) TOPICAL PRN
Status: DISCONTINUED | OUTPATIENT
Start: 2021-08-16 | End: 2021-08-17 | Stop reason: HOSPADM

## 2021-08-16 RX ORDER — SODIUM CHLORIDE 9 MG/ML
25 INJECTION, SOLUTION INTRAVENOUS PRN
Status: DISCONTINUED | OUTPATIENT
Start: 2021-08-16 | End: 2021-08-17 | Stop reason: HOSPADM

## 2021-08-16 RX ORDER — OXYCODONE HYDROCHLORIDE 5 MG/1
5 TABLET ORAL EVERY 6 HOURS PRN
Qty: 12 TABLET | Refills: 0 | Status: SHIPPED | OUTPATIENT
Start: 2021-08-16 | End: 2021-08-19

## 2021-08-16 RX ORDER — OXYCODONE HYDROCHLORIDE AND ACETAMINOPHEN 5; 325 MG/1; MG/1
1 TABLET ORAL EVERY 4 HOURS PRN
Status: DISCONTINUED | OUTPATIENT
Start: 2021-08-16 | End: 2021-08-17 | Stop reason: HOSPADM

## 2021-08-16 RX ORDER — DEXMEDETOMIDINE HYDROCHLORIDE 100 UG/ML
INJECTION, SOLUTION INTRAVENOUS PRN
Status: DISCONTINUED | OUTPATIENT
Start: 2021-08-16 | End: 2021-08-16 | Stop reason: SDUPTHER

## 2021-08-16 RX ORDER — LIDOCAINE HYDROCHLORIDE AND EPINEPHRINE 15; 5 MG/ML; UG/ML
INJECTION, SOLUTION EPIDURAL PRN
Status: DISCONTINUED | OUTPATIENT
Start: 2021-08-16 | End: 2021-08-16 | Stop reason: SDUPTHER

## 2021-08-16 RX ORDER — SODIUM CHLORIDE, SODIUM LACTATE, POTASSIUM CHLORIDE, CALCIUM CHLORIDE 600; 310; 30; 20 MG/100ML; MG/100ML; MG/100ML; MG/100ML
1000 INJECTION, SOLUTION INTRAVENOUS PRN
Status: DISCONTINUED | OUTPATIENT
Start: 2021-08-16 | End: 2021-08-16

## 2021-08-16 RX ORDER — SIMETHICONE 80 MG
80 TABLET,CHEWABLE ORAL EVERY 6 HOURS PRN
Status: DISCONTINUED | OUTPATIENT
Start: 2021-08-16 | End: 2021-08-17 | Stop reason: HOSPADM

## 2021-08-16 RX ORDER — DOCUSATE SODIUM 100 MG/1
100 CAPSULE, LIQUID FILLED ORAL 2 TIMES DAILY
Status: DISCONTINUED | OUTPATIENT
Start: 2021-08-16 | End: 2021-08-16

## 2021-08-16 RX ORDER — ONDANSETRON 2 MG/ML
4 INJECTION INTRAMUSCULAR; INTRAVENOUS EVERY 6 HOURS PRN
Status: DISCONTINUED | OUTPATIENT
Start: 2021-08-16 | End: 2021-08-16

## 2021-08-16 RX ADMIN — Medication 87.3 MILLI-UNITS/MIN: at 15:26

## 2021-08-16 RX ADMIN — IBUPROFEN 800 MG: 800 TABLET, FILM COATED ORAL at 18:24

## 2021-08-16 RX ADMIN — SODIUM CHLORIDE, POTASSIUM CHLORIDE, SODIUM LACTATE AND CALCIUM CHLORIDE: 600; 310; 30; 20 INJECTION, SOLUTION INTRAVENOUS at 09:33

## 2021-08-16 RX ADMIN — SODIUM CHLORIDE, POTASSIUM CHLORIDE, SODIUM LACTATE AND CALCIUM CHLORIDE: 600; 310; 30; 20 INJECTION, SOLUTION INTRAVENOUS at 12:25

## 2021-08-16 RX ADMIN — Medication 1 MILLI-UNITS/MIN: at 11:11

## 2021-08-16 RX ADMIN — ACETAMINOPHEN 1000 MG: 500 TABLET ORAL at 22:51

## 2021-08-16 RX ADMIN — SODIUM CHLORIDE, POTASSIUM CHLORIDE, SODIUM LACTATE AND CALCIUM CHLORIDE: 600; 310; 30; 20 INJECTION, SOLUTION INTRAVENOUS at 06:50

## 2021-08-16 RX ADMIN — DEXMEDETOMIDINE HYDROCHLORIDE 30 MCG: 100 INJECTION, SOLUTION INTRAVENOUS at 09:43

## 2021-08-16 RX ADMIN — DOCUSATE SODIUM 100 MG: 100 CAPSULE, LIQUID FILLED ORAL at 20:56

## 2021-08-16 RX ADMIN — LIDOCAINE HYDROCHLORIDE AND EPINEPHRINE 3 ML: 15; 5 INJECTION, SOLUTION EPIDURAL at 09:40

## 2021-08-16 RX ADMIN — NALBUPHINE HYDROCHLORIDE 10 MG: 10 INJECTION, SOLUTION INTRAMUSCULAR; INTRAVENOUS; SUBCUTANEOUS at 06:53

## 2021-08-16 RX ADMIN — Medication 12 ML/HR: at 09:43

## 2021-08-16 RX ADMIN — Medication 10 ML: at 20:57

## 2021-08-16 ASSESSMENT — PAIN SCALES - GENERAL
PAINLEVEL_OUTOF10: 8
PAINLEVEL_OUTOF10: 3
PAINLEVEL_OUTOF10: 8

## 2021-08-16 NOTE — DISCHARGE SUMMARY
Obstetrical Discharge Form    Gestational Age: 43w3d    Antepartum complications: none    Date of Delivery: 21      Type of Delivery: vaginal, spontaneous    Delivered By: Tanja Eckert     Baby:      Information for the patient's :  Lilo Mason [[id number]]   APGAR One: 9     Information for the patient's :  Lilo Mason [[id number]]   APGAR Five: 9     Information for the patient's :  Lilo Mason [[id number]]   Birth Weight: N/A       Anesthesia: Epidural    Intrapartum complications: None    Postpartum complications: none    Discharge Medication:    Liliana Abdirashidon   Ruby Medication Instructions [facility]:[id number]    Printed on:21 1601   Medication Information                      ibuprofen (ADVIL;MOTRIN) 800 MG tablet  Take 1 tablet by mouth every 8 hours as needed for Pain             oxyCODONE (ROXICODONE) 5 MG immediate release tablet  Take 1 tablet by mouth every 6 hours as needed for Pain for up to 3 days. Intended supply: 3 days. Take lowest dose possible to manage pain             Prenatal Vit-Fe Fumarate-FA (PRENATAL VITAMIN PLUS LOW IRON) 27-1 MG TABS  Take 1 tablet by mouth daily                  Discharge Condition:  good    Discharge Date:     PLAN:  Follow up in 6 weeks for routine PP visit  All questions answered  D/C summary begun at delivery for D/C planning purposes, any delay in discharge from ordered D/C date due to  factors.

## 2021-08-16 NOTE — ANESTHESIA PRE PROCEDURE
Department of Anesthesiology  Preprocedure Note       Name:  Agueda Yuan   Age:  19 y.o.  :  2000                                          MRN:  [id number]         Date:  2021      Surgeon: * No surgeons listed *    Procedure: * No procedures listed *    Medications prior to admission:   Prior to Admission medications    Medication Sig Start Date End Date Taking?  Authorizing Provider   ferrous sulfate (ANG-ALEXANDREA) 325 (65 Fe) MG tablet Take 1 tablet by mouth daily 6/10/21  Yes Will Pineda MD   Prenatal Vit-Fe Fumarate-FA (PRENATAL VITAMIN PLUS LOW IRON) 27-1 MG TABS Take 1 tablet by mouth daily 21  Yes Scooby CARRILLO MD       Current medications:    Current Facility-Administered Medications   Medication Dose Route Frequency Provider Last Rate Last Admin    lactated ringers infusion   Intravenous Continuous Ruthy Alfonso  mL/hr at 21 0650 New Bag at 21 0650    lactated ringers infusion 500 mL  500 mL Intravenous PRN Ruthy Alfonso MD        Or    lactated ringers infusion 1,000 mL  1,000 mL Intravenous PRN Ruthy Alfonso MD        ondansetron [facility]) injection 4 mg  4 mg Intravenous Q6H PRN Ruthy Alfonso MD        acetaminophen (TYLENOL) tablet 650 mg  650 mg Oral Q4H PRN Ruthy Alfonso MD        benzocaine-menthol (DERMOPLAST) 20-0.5 % spray   Topical PRN Ruthy Alfonso MD        docusate sodium (COLACE) capsule 100 mg  100 mg Oral BID Ruthy Alfonso MD        lactated ringers infusion   Intravenous Continuous Ruthy Alfonso MD        nalbuphine (NUBAIN) injection 10 mg  10 mg Intravenous Q3H PRN Ruthy Alfonso MD           Allergies:  No Known Allergies    Problem List:    Patient Active Problem List   Diagnosis Code    Language barrier Z68.5    Term pregnancy Z34.90    Labor and delivery, indication for care O75.9       Past Medical History:        Diagnosis Date    Anemia     on oral iron    History of epidural anesthesia Past Surgical History:  History reviewed. No pertinent surgical history. Social History:    Social History     Tobacco Use    Smoking status: Never Smoker    Smokeless tobacco: Never Used   Substance Use Topics    Alcohol use: Never                                Counseling given: Not Answered      Vital Signs (Current):   Vitals:    08/16/21 0452 08/16/21 0455   BP: 120/77    Pulse: 62    Resp:  18   Temp:  36.8 °C (98.2 °F)   Height:  5' (1.524 m)                                              BP Readings from Last 3 Encounters:   08/16/21 120/77   08/15/21 120/74   08/10/21 112/77       NPO Status: Time of last liquid consumption: 0400                        Time of last solid consumption: 2100                        Date of last liquid consumption: 08/16/21                        Date of last solid food consumption: 08/15/21    BMI:   Wt Readings from Last 3 Encounters:   08/10/21 140 lb (63.5 kg)   08/03/21 140 lb (63.5 kg)   08/02/21 135 lb (61.2 kg)     Body mass index is 27.34 kg/m². CBC:   Lab Results   Component Value Date    WBC 7.7 07/27/2021    RBC 4.07 07/27/2021    HGB 12.9 07/27/2021    HCT 36.7 07/27/2021    MCV 90.0 07/27/2021    RDW 13.9 07/27/2021     07/27/2021       CMP: No results found for: NA, K, CL, CO2, BUN, CREATININE, GFRAA, AGRATIO, LABGLOM, GLUCOSE, PROT, CALCIUM, BILITOT, ALKPHOS, AST, ALT    POC Tests: No results for input(s): POCGLU, POCNA, POCK, POCCL, POCBUN, POCHEMO, POCHCT in the last 72 hours.     Coags: No results found for: PROTIME, INR, APTT    HCG (If Applicable): No results found for: PREGTESTUR, PREGSERUM, HCG, HCGQUANT     ABGs: No results found for: PHART, PO2ART, YVW2DIE, RHO6CIJ, BEART, O7AXRREY     Type & Screen (If Applicable):  No results found for: LABABO, LABRH    Drug/Infectious Status (If Applicable):  No results found for: HIV, HEPCAB    COVID-19 Screening (If Applicable): No results found for: COVID19        Anesthesia Evaluation  Patient summary reviewed and Nursing notes reviewed  Airway: Mallampati: II        Dental: normal exam         Pulmonary:Negative Pulmonary ROS and normal exam  breath sounds clear to auscultation                             Cardiovascular:Negative CV ROS          NYHA Classification: I    Rhythm: regular  Rate: normal           Beta Blocker:  Not on Beta Blocker         Neuro/Psych:   Negative Neuro/Psych ROS              GI/Hepatic/Renal: Neg GI/Hepatic/Renal ROS            Endo/Other:    (+) blood dyscrasia: anemia:., .          Pt had no PAT visit       Abdominal:             Vascular: negative vascular ROS. Other Findings:             Anesthesia Plan      epidural     ASA 2             Anesthetic plan and risks discussed with patient. Use of blood products discussed with patient whom consented to blood products. Patient request pain control for labor and delivery. Discussed procedure (epidural,spinal, general and non regional options) and  options. Alternatives, benefits, risks, including but not limited to changes in VSS, allergic reaction, infection, intravascular injection, paresthesia, n/v, severe headache, temporary or permanent rare neurologic sequelae, and failed block. All questions answered and states understanding. Patient agrees to proceed. Choice of anesthetic will be determined by clinical condition at the time of anesthesia initiation.         VIKA Perera - CRNA   2021

## 2021-08-16 NOTE — FLOWSHEET NOTE
Pt to tri c c/o contractions. Pt states that she woke up @ 0200 with increased pain from ctx's. EFM applied. SVE 1-2/60/-2 intact. Will continue to monitor.

## 2021-08-16 NOTE — PROCEDURES
was spontaneous. The perineum and vagina were explored and a first degree and bilateral labial laceration was repaired in standard fashion.

## 2021-08-16 NOTE — ANESTHESIA PROCEDURE NOTES
Epidural Block    Patient location during procedure: OB  Start time: 2021 9:22 AM  End time: 2021 9:40 AM  Reason for block: labor epidural  Staffing  Performed: resident/CRNA   Anesthesiologist: Darrel Heck MD  Resident/CRNA: Deirdre Justin APRN - CRNA  Preanesthetic Checklist  Completed: patient identified, IV checked, site marked, risks and benefits discussed, surgical consent, monitors and equipment checked, pre-op evaluation, timeout performed, anesthesia consent given, oxygen available and patient being monitored  Epidural  Patient position: sitting  Prep: ChloraPrep and site prepped and draped  Patient monitoring: continuous pulse ox and frequent blood pressure checks  Approach: midline  Location: lumbar (1-5)  Injection technique: LESTER saline  Provider prep: sterile gloves  Needle  Needle type: Tuohy   Needle insertion depth: 7 cm  Catheter type: side hole  Catheter size: 18 G  Catheter at skin depth: 12 cm  Assessment  Sensory level: T10  Hemodynamics: stable  Attempts: 1  Additional Notes  Procedure(labor epidural):    Called at 2664 for labor epidural analgesia request. Patient identified, informed consent obtained, and timeout performed. Medical and Surgical history reviewed with pt. Risks/benefits/alternatives of epidural discussed including allergic reaction, infection, bleeding, hypotension, headache, back pain, nerve damage, failed or one-sided block. Also discussed anesthesia options and associated risks in the event of . All questions answered. Verbalizes understanding and requests to proceed. VSS:  Stable throughout      Pt in sitting position. Labor epidural placed using LESTER sterile technique (donned mask, hat, and sterile gloves). Back prepped with Chloraprep x 2. Sterile drape applied. Site: L3-4  LESTER:  7cm. Attempts:  1  Re-directs: 0  Site infiltrated with 3ml 1%Lidocaine(25g). 17G Tuohy needle inserted, LESTER technique with saline.  Epidural space dilated with saline. Threaded spring wound epidural catheter through Tuohy needle easily. No heme, CSF, pain with injection, or paresthesias noted. Tuohy needle withdrawn. Test Dose: 0940 Negative aspiration or pain with injection. 3cc of 1.5% Lido with epi 1:200,000 test dose given. Negative test dose. Skin:  12cm catheter taped at the skin. Secured with steri strips, tegaderm, and tape. Bolus Dose: 5ml of 30mcg Precedex. Negative aspiration or pain with injection. Given in 2ml increments. Infusion: 0.125% bupivacaine with Fentanyl (2ug/ml)  Auto bolus 4ml every 20 minutes. (Max. Dose- 40 ml/hr.)      Sensory Level:  R:  T10  L:  T10    Motor: 4/5  VAS: start 10/10, end 0/10. Stated comfort and acceptable to patient. Patient in supine/HOB position with left uterine displacement. VSS.

## 2021-08-16 NOTE — FLOWSHEET NOTE
Pt back to bed after walking in the hallways. EFM re applied. SVE unchanged. Pt denies any c/o. Will continue to monitor.

## 2021-08-17 VITALS
OXYGEN SATURATION: 100 % | HEART RATE: 77 BPM | TEMPERATURE: 98 F | SYSTOLIC BLOOD PRESSURE: 104 MMHG | HEIGHT: 60 IN | RESPIRATION RATE: 17 BRPM | BODY MASS INDEX: 27.48 KG/M2 | DIASTOLIC BLOOD PRESSURE: 68 MMHG | WEIGHT: 140 LBS

## 2021-08-17 LAB — TOTAL SYPHILLIS IGG/IGM: NORMAL

## 2021-08-17 PROCEDURE — 2580000003 HC RX 258: Performed by: OBSTETRICS & GYNECOLOGY

## 2021-08-17 PROCEDURE — 6370000000 HC RX 637 (ALT 250 FOR IP): Performed by: OBSTETRICS & GYNECOLOGY

## 2021-08-17 RX ADMIN — DOCUSATE SODIUM 100 MG: 100 CAPSULE, LIQUID FILLED ORAL at 09:21

## 2021-08-17 RX ADMIN — OXYCODONE HYDROCHLORIDE AND ACETAMINOPHEN 1 TABLET: 5; 325 TABLET ORAL at 17:07

## 2021-08-17 RX ADMIN — ACETAMINOPHEN 1000 MG: 500 TABLET ORAL at 08:11

## 2021-08-17 RX ADMIN — OXYCODONE HYDROCHLORIDE AND ACETAMINOPHEN 1 TABLET: 5; 325 TABLET ORAL at 04:27

## 2021-08-17 RX ADMIN — IBUPROFEN 800 MG: 800 TABLET, FILM COATED ORAL at 12:57

## 2021-08-17 RX ADMIN — Medication 10 ML: at 10:27

## 2021-08-17 RX ADMIN — OXYCODONE HYDROCHLORIDE AND ACETAMINOPHEN 1 TABLET: 5; 325 TABLET ORAL at 09:21

## 2021-08-17 RX ADMIN — IBUPROFEN 800 MG: 800 TABLET, FILM COATED ORAL at 02:21

## 2021-08-17 ASSESSMENT — PAIN SCALES - GENERAL
PAINLEVEL_OUTOF10: 6
PAINLEVEL_OUTOF10: 1
PAINLEVEL_OUTOF10: 0
PAINLEVEL_OUTOF10: 6
PAINLEVEL_OUTOF10: 6
PAINLEVEL_OUTOF10: 3

## 2021-08-17 NOTE — LACTATION NOTE
This note was copied from a baby's chart. Lactation Consult Note      LC to room per RN request to assess feeding. MOB states NB latched well to breast for first few feedings. NB was still at breast as this 1925 [facility] entered room. .  R/L nipple is WNL/everted; tissue is very stretchy. Colostrum expressed easily per mom, NB latched easily at L breast in cradle hold. MOB used c-shape hold fro initial latch; CAROL ANN, SRS, and AS. Mother denies any pain with latch. NB off breast at end feeding; nipple everted; no creasing. NB is looking satisfied after feeding. 1925 [facility] number on board, to call for information when  is present.

## 2021-08-17 NOTE — PROGRESS NOTES
CLINICAL PHARMACY NOTE: MEDS TO BEDS    Total # of Prescriptions Filled: 2   The following medications were delivered to the patient:  · Ibuprofen 800 mg  · Oxycodone 5 mg    Additional Documentation:  Delivered to Patient =signed  Roseline Sawant CPhT
Prescriptions for Ibuprofen and Oxycodone explained and given to patient. All questions answered at this time. Prescriptions placed in front pocket of patient education folder.
Daily PRN, Keyana Saez MD    sennosides-docusate sodium (SENOKOT-S) 8.6-50 MG tablet 1 tablet, 1 tablet, Oral, Daily PRN, Keyana Saez MD    lansinoh lanolin ointment, , Topical, PRN, Keyana Nadja, MD    witch hazel-glycerin (TUCKS) pad, , Topical, PRN, Keyana Saez MD    hydrocortisone 2.5 % cream, , Topical, Q2H PRN, Keyana Saez MD    benzocaine-menthol (DERMOPLAST) 20-0.5 % spray, , Topical, PRN, Keyana Saez MD    ferrous sulfate (IRON 325) tablet 325 mg, 325 mg, Oral, Daily, Keyana Saez MD    measles, mumps & rubella vaccine (MMR) injection 0.5 mL, 0.5 mL, Subcutaneous, Prior to discharge, Keyana Saez MD    Tetanus-Diphth-Acell Pertussis (BOOSTRIX) injection 0.5 mL, 0.5 mL, Intramuscular, Prior to discharge, Keyana Saez MD    oxyCODONE-acetaminophen (PERCOCET) 5-325 MG per tablet 1 tablet, 1 tablet, Oral, Q4H PRN, 1 tablet at 08/17/21 0427 **OR** oxyCODONE-acetaminophen (PERCOCET) 5-325 MG per tablet 2 tablet, 2 tablet, Oral, Q4H PRN, Keyana Saez MD     Assessment/Plan:      Post Partum: advance Postpartum care

## 2021-08-27 NOTE — H&P
Department of Obstetrics and Gynecology   Obstetrics History and Physical        CHIEF COMPLAINT:  contractions    HISTORY OF PRESENT ILLNESS:      The patient is a 19 y.o. female at 43w3d. OB History        2    Para   2    Term   2            AB        Living   2       SAB        TAB        Ectopic        Molar        Multiple   0    Live Births   2            Patient presents with a chief complaint as above and is being admitted for active phase labor    Estimated Due Date: Estimated Date of Delivery: 21    PRENATAL CARE:    Complicated by: none    PAST OB HISTORY:  OB History        2    Para   2    Term   2            AB        Living   2       SAB        TAB        Ectopic        Molar        Multiple   0    Live Births   2                Past Medical History:        Diagnosis Date    Anemia     on oral iron    History of epidural anesthesia      Past Surgical History:    History reviewed. No pertinent surgical history. Allergies:  Patient has no known allergies. Social History:    Social History     Socioeconomic History    Marital status:      Spouse name: Not on file    Number of children: Not on file    Years of education: Not on file    Highest education level: Not on file   Occupational History    Not on file   Tobacco Use    Smoking status: Never Smoker    Smokeless tobacco: Never Used   Vaping Use    Vaping Use: Never used   Substance and Sexual Activity    Alcohol use: Never    Drug use: Never    Sexual activity: Yes     Partners: Male   Other Topics Concern    Not on file   Social History Narrative    Not on file     Social Determinants of Health     Financial Resource Strain:     Difficulty of Paying Living Expenses:    Food Insecurity:     Worried About Running Out of Food in the Last Year:     [address] in the Last Year:    Transportation Needs:     Lack of Transportation (Medical):      Lack of Transportation (Non-Medical): Physical Activity:     Days of Exercise per Week:     Minutes of Exercise per Session:    Stress:     Feeling of Stress :    Social Connections:     Frequency of Communication with Friends and Family:     Frequency of Social Gatherings with Friends and Family:     Attends Restoration Services:     Active Member of Clubs or Organizations:     Attends Club or Organization Meetings:     Marital Status:    Intimate Partner Violence:     Fear of Current or Ex-Partner:     Emotionally Abused:     Physically Abused:     Sexually Abused:      Family History:       Problem Relation Age of Onset    Hypertension Father      Medications Prior to Admission:  No medications prior to admission. REVIEW OF SYSTEMS:    CONSTITUTIONAL:  negative  RESPIRATORY:  negative  CARDIOVASCULAR:  negative  GASTROINTESTINAL:  negative  ALLERGIC/IMMUNOLOGIC:  negative  NEUROLOGICAL:  negative  BEHAVIOR/PSYCH:  negative    PHYSICAL EXAM:  Vitals:    08/16/21 1809 08/16/21 2200 08/17/21 0205 08/17/21 1000   BP: 130/86 95/61 (!) 101/56 104/68   Pulse: 70 63 67 77   Resp: 17 16 16 17   Temp: 99.4 °F (37.4 °C) 98.7 °F (37.1 °C) 97 °F (36.1 °C) 98 °F (36.7 °C)   TempSrc: Oral      SpO2:       Weight:       Height:         General appearance:  awake, alert, cooperative, no apparent distress, and appears stated age  Neurologic:  Awake, alert, oriented to name, place and time. Lungs:  No increased work of breathing, good air exchange  Abdomen:  Soft, non tender, gravid, consistent with her gestational age   Fetal heart rate:  Reassuring.   Pelvis:  Adequate pelvis  Cervix: 2 cm 50% medium -2  Contraction frequency:  3 minutes    Membranes:  Intact    ASSESSMENT AND PLAN:    Labor: Admit, anticipate normal delivery, routine labor orders  Fetus: Reassuring  GBS: No

## 2021-09-28 ENCOUNTER — POSTPARTUM VISIT (OUTPATIENT)
Dept: OBGYN | Age: 21
End: 2021-09-28

## 2021-09-28 VITALS
WEIGHT: 124.6 LBS | DIASTOLIC BLOOD PRESSURE: 72 MMHG | SYSTOLIC BLOOD PRESSURE: 100 MMHG | HEART RATE: 82 BPM | BODY MASS INDEX: 24.33 KG/M2

## 2021-09-28 PROCEDURE — 99213 OFFICE O/P EST LOW 20 MIN: CPT | Performed by: OBSTETRICS & GYNECOLOGY

## 2021-09-28 NOTE — PROGRESS NOTES
Social Service Note:   Patient completed depression scale. Patient scored a 2  and is not showing signs of depression. Patient didn't have any other questions or needs at this time. Pt has [facility] for infant.      Drew PARKS, [facility]

## 2021-09-28 NOTE — PROGRESS NOTES
[facility]  Postpartum Visit  Patient Name:  Josiah Luu  YOB: 2000      HPI     The patient is a 19 y.o. female   OB History        2    Para   2    Term   2            AB        Living   2       SAB        TAB        Ectopic        Molar        Multiple   0    Live Births   2             who presents for her 6 weeks postpartum visit. Her pregnancy was uncomplicated. Problem list: none  She has no unusual complaints. Delivery:     Procedure:  Spontaneous vaginal delivery    Surgeon:   Meenu Velazquez    OB History    Para Term  AB Living   2 2 2 0 0 2   SAB TAB Ectopic Molar Multiple Live Births   0 0 0 0 0 2      # Outcome Date GA Lbr Merlin/2nd Weight Sex Delivery Anes PTL Lv   2 Term 19 39w4d 05:21 / 00:51 7 lb 7.9 oz (3.4 kg) M Vag-Spont EPI N DANE      Birth Comments: Hugs: 092; ID Band: [id number] [facility]      Name: Imelda Angle: [address]: 9   1 Term 18 40w4d / 02:07 8 lb 1.8 oz (3.679 kg) M Vag-Vacuum EPI N DANE      Birth Comments:       Name: Lamin Dewey: 8  Apgar5: 8       Anesthesia:  Epidural    Delivery complications:  None    She is currently breast    Her vaginal bleeding is none now. She has had a period: No:     She declined contraception. Last Pap: 2021Hall    ROS     Gastrointestinal: negative  Genitourinary:negative  Behavioral/Psych: negative      EXAM     Vital Signs:  /72   Pulse 82   LMP 10/30/2020 (Approximate)      General: Mood is well. Abdomen: soft, nontender, no masses    Pelvic: Vulva WNL, vagina WNL, cervix WNL, uterus WNL, perineum WNL      ASSESSMENT     1. Routine Postpartum visit  2.  Contraception counseling      PLAN     Discharge from [facility]ue[facility]a Prather MD  2021

## 2022-07-25 NOTE — PROGRESS NOTES
Pt with no c/o today; Patient denies vaginal bleeding and leaking of fluid/ctx. MFM ultrasound yesterday. 2

## 2023-09-18 NOTE — PROGRESS NOTES
Social Service Note:  Met with patient to complete the depression scale and give a packet of information on  SIDs,  and Car Seat Safety. Patient scored a 1  on depression scale and is not showing symptoms or signs of depression.   Pt is getting a car seat and has a bassinet/Anne Marie Shi BSW, LSW Epidermal Sutures: 5-0 Fast Absorbing Gut